# Patient Record
Sex: FEMALE | Race: WHITE | NOT HISPANIC OR LATINO | Employment: FULL TIME | URBAN - NONMETROPOLITAN AREA
[De-identification: names, ages, dates, MRNs, and addresses within clinical notes are randomized per-mention and may not be internally consistent; named-entity substitution may affect disease eponyms.]

---

## 2017-05-27 ENCOUNTER — APPOINTMENT (EMERGENCY)
Dept: MRI IMAGING | Facility: HOSPITAL | Age: 37
End: 2017-05-27
Payer: COMMERCIAL

## 2017-05-27 ENCOUNTER — APPOINTMENT (EMERGENCY)
Dept: CT IMAGING | Facility: HOSPITAL | Age: 37
End: 2017-05-27
Payer: COMMERCIAL

## 2017-05-27 ENCOUNTER — HOSPITAL ENCOUNTER (EMERGENCY)
Facility: HOSPITAL | Age: 37
Discharge: HOME | End: 2017-05-27
Attending: EMERGENCY MEDICINE
Payer: COMMERCIAL

## 2017-05-27 VITALS
SYSTOLIC BLOOD PRESSURE: 120 MMHG | DIASTOLIC BLOOD PRESSURE: 71 MMHG | BODY MASS INDEX: 24.92 KG/M2 | OXYGEN SATURATION: 100 % | RESPIRATION RATE: 18 BRPM | HEIGHT: 62 IN | HEART RATE: 78 BPM | TEMPERATURE: 99.2 F | WEIGHT: 135.4 LBS

## 2017-05-27 DIAGNOSIS — H53.8 BLURRY VISION, RIGHT EYE: Primary | ICD-10-CM

## 2017-05-27 LAB
ALBUMIN SERPL BCP-MCNC: 4 G/DL (ref 3.5–5)
ALP SERPL-CCNC: 54 U/L (ref 46–116)
ALT SERPL W P-5'-P-CCNC: 32 U/L (ref 12–78)
ANION GAP SERPL CALCULATED.3IONS-SCNC: 9 MMOL/L (ref 4–13)
AST SERPL W P-5'-P-CCNC: 21 U/L (ref 5–45)
BASOPHILS # BLD AUTO: 0.03 THOUSANDS/ΜL (ref 0–0.1)
BASOPHILS NFR BLD AUTO: 1 % (ref 0–1)
BILIRUB SERPL-MCNC: 0.2 MG/DL (ref 0.2–1)
BILIRUB UR QL STRIP: NEGATIVE
BUN SERPL-MCNC: 29 MG/DL (ref 5–25)
CALCIUM SERPL-MCNC: 8.6 MG/DL (ref 8.3–10.1)
CHLORIDE SERPL-SCNC: 108 MMOL/L (ref 100–108)
CLARITY UR: NORMAL
CO2 SERPL-SCNC: 25 MMOL/L (ref 21–32)
COLOR UR: YELLOW
CREAT SERPL-MCNC: 0.73 MG/DL (ref 0.6–1.3)
CRP SERPL QL: <3 MG/L
EOSINOPHIL # BLD AUTO: 0.1 THOUSAND/ΜL (ref 0–0.61)
EOSINOPHIL NFR BLD AUTO: 2 % (ref 0–6)
ERYTHROCYTE [DISTWIDTH] IN BLOOD BY AUTOMATED COUNT: 12.9 % (ref 11.6–15.1)
ERYTHROCYTE [SEDIMENTATION RATE] IN BLOOD: 5 MM/HOUR (ref 0–20)
GFR SERPL CREATININE-BSD FRML MDRD: >60 ML/MIN/1.73SQ M
GLUCOSE SERPL-MCNC: 90 MG/DL (ref 65–140)
GLUCOSE UR STRIP-MCNC: NEGATIVE MG/DL
HCG UR QL: NEGATIVE
HCT VFR BLD AUTO: 39.1 % (ref 34.8–46.1)
HGB BLD-MCNC: 13.2 G/DL (ref 11.5–15.4)
HGB UR QL STRIP.AUTO: NEGATIVE
HOLD SPECIMEN: YES
INR PPP: 1.06 (ref 0.86–1.16)
KETONES UR STRIP-MCNC: NEGATIVE MG/DL
LEUKOCYTE ESTERASE UR QL STRIP: NEGATIVE
LYMPHOCYTES # BLD AUTO: 1.49 THOUSANDS/ΜL (ref 0.6–4.47)
LYMPHOCYTES NFR BLD AUTO: 30 % (ref 14–44)
MCH RBC QN AUTO: 32 PG (ref 26.8–34.3)
MCHC RBC AUTO-ENTMCNC: 33.8 G/DL (ref 31.4–37.4)
MCV RBC AUTO: 95 FL (ref 82–98)
MONOCYTES # BLD AUTO: 0.36 THOUSAND/ΜL (ref 0.17–1.22)
MONOCYTES NFR BLD AUTO: 7 % (ref 4–12)
NEUTROPHILS # BLD AUTO: 2.96 THOUSANDS/ΜL (ref 1.85–7.62)
NEUTS SEG NFR BLD AUTO: 60 % (ref 43–75)
NITRITE UR QL STRIP: NEGATIVE
PH UR STRIP.AUTO: 7 [PH] (ref 4.5–8)
PLATELET # BLD AUTO: 247 THOUSANDS/UL (ref 149–390)
PMV BLD AUTO: 10.2 FL (ref 8.9–12.7)
POTASSIUM SERPL-SCNC: 3.8 MMOL/L (ref 3.5–5.3)
PROT SERPL-MCNC: 6.7 G/DL (ref 6.4–8.2)
PROT UR STRIP-MCNC: NEGATIVE MG/DL
PROTHROMBIN TIME: 13.7 SECONDS (ref 12.1–14.4)
RBC # BLD AUTO: 4.13 MILLION/UL (ref 3.81–5.12)
SODIUM SERPL-SCNC: 142 MMOL/L (ref 136–145)
SP GR UR STRIP.AUTO: 1.02 (ref 1–1.03)
TROPONIN I SERPL-MCNC: <0.02 NG/ML
TSH SERPL DL<=0.05 MIU/L-ACNC: 2.01 UIU/ML (ref 0.36–3.74)
UROBILINOGEN UR QL STRIP.AUTO: 0.2 E.U./DL
WBC # BLD AUTO: 4.94 THOUSAND/UL (ref 4.31–10.16)

## 2017-05-27 PROCEDURE — 85025 COMPLETE CBC W/AUTO DIFF WBC: CPT | Performed by: EMERGENCY MEDICINE

## 2017-05-27 PROCEDURE — 84484 ASSAY OF TROPONIN QUANT: CPT | Performed by: EMERGENCY MEDICINE

## 2017-05-27 PROCEDURE — 70553 MRI BRAIN STEM W/O & W/DYE: CPT

## 2017-05-27 PROCEDURE — 99284 EMERGENCY DEPT VISIT MOD MDM: CPT

## 2017-05-27 PROCEDURE — 86140 C-REACTIVE PROTEIN: CPT | Performed by: EMERGENCY MEDICINE

## 2017-05-27 PROCEDURE — 70549 MR ANGIOGRAPH NECK W/O&W/DYE: CPT

## 2017-05-27 PROCEDURE — 85652 RBC SED RATE AUTOMATED: CPT | Performed by: EMERGENCY MEDICINE

## 2017-05-27 PROCEDURE — 81003 URINALYSIS AUTO W/O SCOPE: CPT | Performed by: EMERGENCY MEDICINE

## 2017-05-27 PROCEDURE — 36415 COLL VENOUS BLD VENIPUNCTURE: CPT | Performed by: EMERGENCY MEDICINE

## 2017-05-27 PROCEDURE — A9585 GADOBUTROL INJECTION: HCPCS | Performed by: EMERGENCY MEDICINE

## 2017-05-27 PROCEDURE — 96374 THER/PROPH/DIAG INJ IV PUSH: CPT

## 2017-05-27 PROCEDURE — 85610 PROTHROMBIN TIME: CPT | Performed by: EMERGENCY MEDICINE

## 2017-05-27 PROCEDURE — 81025 URINE PREGNANCY TEST: CPT | Performed by: EMERGENCY MEDICINE

## 2017-05-27 PROCEDURE — 80053 COMPREHEN METABOLIC PANEL: CPT | Performed by: EMERGENCY MEDICINE

## 2017-05-27 PROCEDURE — 93005 ELECTROCARDIOGRAM TRACING: CPT | Performed by: EMERGENCY MEDICINE

## 2017-05-27 PROCEDURE — 84443 ASSAY THYROID STIM HORMONE: CPT | Performed by: EMERGENCY MEDICINE

## 2017-05-27 PROCEDURE — 70450 CT HEAD/BRAIN W/O DYE: CPT

## 2017-05-27 RX ORDER — PREDNISONE 20 MG/1
60 TABLET ORAL ONCE
Status: COMPLETED | OUTPATIENT
Start: 2017-05-27 | End: 2017-05-27

## 2017-05-27 RX ORDER — FOLIC ACID 1 MG/1
1 TABLET ORAL DAILY
COMMUNITY

## 2017-05-27 RX ORDER — DIPHENHYDRAMINE HYDROCHLORIDE 50 MG/ML
50 INJECTION INTRAMUSCULAR; INTRAVENOUS ONCE
Status: COMPLETED | OUTPATIENT
Start: 2017-05-27 | End: 2017-05-27

## 2017-05-27 RX ORDER — ASPIRIN 81 MG/1
81 TABLET ORAL DAILY
COMMUNITY

## 2017-05-27 RX ORDER — ZONISAMIDE 100 MG/1
100 CAPSULE ORAL 3 TIMES DAILY
COMMUNITY

## 2017-05-27 RX ADMIN — PREDNISONE 60 MG: 20 TABLET ORAL at 14:39

## 2017-05-27 RX ADMIN — DIPHENHYDRAMINE HYDROCHLORIDE 50 MG: 50 INJECTION, SOLUTION INTRAMUSCULAR; INTRAVENOUS at 14:42

## 2017-05-27 RX ADMIN — GADOBUTROL 6 ML: 604.72 INJECTION INTRAVENOUS at 15:52

## 2017-05-30 LAB
ATRIAL RATE: 77 BPM
P AXIS: 63 DEGREES
PR INTERVAL: 144 MS
QRS AXIS: 90 DEGREES
QRSD INTERVAL: 90 MS
QT INTERVAL: 386 MS
QTC INTERVAL: 436 MS
T WAVE AXIS: 66 DEGREES
VENTRICULAR RATE: 77 BPM

## 2019-11-19 ENCOUNTER — TRANSCRIBE ORDERS (OUTPATIENT)
Dept: ADMINISTRATIVE | Facility: HOSPITAL | Age: 39
End: 2019-11-19

## 2019-11-19 DIAGNOSIS — Z12.31 ENCOUNTER FOR SCREENING MAMMOGRAM FOR MALIGNANT NEOPLASM OF BREAST: Primary | ICD-10-CM

## 2019-11-19 DIAGNOSIS — Z80.41 FAMILY HISTORY OF OVARIAN CANCER: ICD-10-CM

## 2019-12-26 ENCOUNTER — HOSPITAL ENCOUNTER (OUTPATIENT)
Dept: ULTRASOUND IMAGING | Facility: HOSPITAL | Age: 39
Discharge: HOME | End: 2019-12-26
Payer: COMMERCIAL

## 2019-12-26 ENCOUNTER — HOSPITAL ENCOUNTER (OUTPATIENT)
Dept: MAMMOGRAPHY | Facility: HOSPITAL | Age: 39
Discharge: HOME | End: 2019-12-26
Payer: COMMERCIAL

## 2019-12-26 VITALS — WEIGHT: 147 LBS | BODY MASS INDEX: 27.05 KG/M2 | HEIGHT: 62 IN

## 2019-12-26 DIAGNOSIS — Z12.31 ENCOUNTER FOR SCREENING MAMMOGRAM FOR MALIGNANT NEOPLASM OF BREAST: ICD-10-CM

## 2019-12-26 DIAGNOSIS — Z80.41 FAMILY HISTORY OF OVARIAN CANCER: ICD-10-CM

## 2019-12-26 DIAGNOSIS — N64.4 PAIN IN THE BREAST: ICD-10-CM

## 2019-12-26 PROCEDURE — 76642 ULTRASOUND BREAST LIMITED: CPT

## 2019-12-26 PROCEDURE — 77066 DX MAMMO INCL CAD BI: CPT

## 2019-12-26 PROCEDURE — G0279 TOMOSYNTHESIS, MAMMO: HCPCS

## 2023-11-22 ENCOUNTER — APPOINTMENT (RX ONLY)
Dept: URBAN - NONMETROPOLITAN AREA CLINIC 4 | Facility: CLINIC | Age: 43
Setting detail: DERMATOLOGY
End: 2023-11-22

## 2023-11-22 DIAGNOSIS — L71.8 OTHER ROSACEA: ICD-10-CM

## 2023-11-22 PROCEDURE — 99203 OFFICE O/P NEW LOW 30 MIN: CPT

## 2023-11-22 PROCEDURE — ? TREATMENT REGIMEN

## 2023-11-22 PROCEDURE — ? PRESCRIPTION

## 2023-11-22 PROCEDURE — ? COUNSELING

## 2023-11-22 RX ORDER — METRONIDAZOLE 7.5 MG/G
0.75% CREAM TOPICAL QD
Qty: 45 | Refills: 3 | Status: ERX | COMMUNITY
Start: 2023-11-22

## 2023-11-22 RX ORDER — DOXYCYCLINE HYCLATE 100 MG/1
100MG TABLET, COATED ORAL BID
Qty: 60 | Refills: 4 | Status: ERX | COMMUNITY
Start: 2023-11-22

## 2023-11-22 RX ADMIN — DOXYCYCLINE HYCLATE 100MG: 100 TABLET, COATED ORAL at 00:00

## 2023-11-22 RX ADMIN — METRONIDAZOLE 0.75%: 7.5 CREAM TOPICAL at 00:00

## 2023-11-22 ASSESSMENT — LOCATION DETAILED DESCRIPTION DERM
LOCATION DETAILED: LEFT CENTRAL MALAR CHEEK
LOCATION DETAILED: RIGHT MEDIAL MALAR CHEEK

## 2023-11-22 ASSESSMENT — LOCATION SIMPLE DESCRIPTION DERM
LOCATION SIMPLE: LEFT CHEEK
LOCATION SIMPLE: RIGHT CHEEK

## 2023-11-22 ASSESSMENT — SEVERITY ASSESSMENT OVERALL AMONG ALL PATIENTS
IN YOUR EXPERIENCE, AMONG ALL PATIENTS YOU HAVE SEEN WITH THIS CONDITION, HOW SEVERE IS THIS PATIENT'S CONDITION?: MILD TO MODERATE

## 2023-11-22 ASSESSMENT — LOCATION ZONE DERM: LOCATION ZONE: FACE

## 2023-11-22 NOTE — HPI: RASH (ROSACEA)
How Severe Is Your Rosacea?: mild
Is This A New Presentation, Or A Follow-Up?: Rash
Additional History: Patient was on doxycycline 150 QD , eucrisa and rhofade

## 2024-07-19 ENCOUNTER — OFFICE VISIT (OUTPATIENT)
Dept: GASTROENTEROLOGY | Facility: CLINIC | Age: 44
End: 2024-07-19
Payer: COMMERCIAL

## 2024-07-19 VITALS
SYSTOLIC BLOOD PRESSURE: 113 MMHG | WEIGHT: 147.8 LBS | BODY MASS INDEX: 25.23 KG/M2 | HEART RATE: 83 BPM | OXYGEN SATURATION: 97 % | DIASTOLIC BLOOD PRESSURE: 70 MMHG | TEMPERATURE: 98.3 F | HEIGHT: 64 IN

## 2024-07-19 DIAGNOSIS — K59.09 CHRONIC CONSTIPATION: ICD-10-CM

## 2024-07-19 DIAGNOSIS — R10.12 LUQ PAIN: ICD-10-CM

## 2024-07-19 DIAGNOSIS — Z00.00 HEALTHCARE MAINTENANCE: ICD-10-CM

## 2024-07-19 DIAGNOSIS — Z87.11 HISTORY OF PEPTIC ULCER: Primary | ICD-10-CM

## 2024-07-19 DIAGNOSIS — R19.5 DARK STOOLS: ICD-10-CM

## 2024-07-19 PROCEDURE — 99204 OFFICE O/P NEW MOD 45 MIN: CPT | Performed by: PHYSICIAN ASSISTANT

## 2024-07-19 RX ORDER — PANTOPRAZOLE SODIUM 40 MG/1
40 TABLET, DELAYED RELEASE ORAL 2 TIMES DAILY
Qty: 180 TABLET | Refills: 3 | Status: SHIPPED | OUTPATIENT
Start: 2024-07-19

## 2024-07-19 RX ORDER — PANTOPRAZOLE SODIUM 40 MG/1
40 TABLET, DELAYED RELEASE ORAL 2 TIMES DAILY
COMMUNITY
End: 2024-07-19

## 2024-07-19 RX ORDER — FAMOTIDINE 40 MG/1
40 TABLET, FILM COATED ORAL DAILY
Qty: 30 TABLET | Refills: 5 | Status: SHIPPED | OUTPATIENT
Start: 2024-07-19

## 2024-07-19 RX ORDER — RIMEGEPANT SULFATE 75 MG/75MG
75 TABLET, ORALLY DISINTEGRATING ORAL DAILY PRN
COMMUNITY

## 2024-07-19 RX ORDER — TOPIRAMATE SPINKLE 25 MG/1
75 CAPSULE ORAL
COMMUNITY

## 2024-07-19 NOTE — PROGRESS NOTES
[facility] - Outpatient Consultation  Camille Barrientos 43 y.o. female MRN: [id number]  Encounter: [id number]    ASSESSMENT AND PLAN:      1. History of peptic ulcer  2. LUQ pain  3. Dark stools    Pt with hx of PUD in early 2024 which was treated with PPI therapy.   Complete records not available but appears bx were negative for h pylori and celiac disease.   Now with recurrence of abdominal pain and endorsement of dark appearing stools, which may indicate melena vs other etiol.     Recommend repeat EGD now to evaluate for recurrence of PUD.   Continue PPI BID and add nightly H2RA for symptom relief.   Check CBC to ensure no new iron def anemia.   Small frequent meals and diet and lifestyle modifications for GERD.     I obtained informed consent from the patient. The risks/benefits/alternatives of the procedure were discussed with the patient. Risks included, but not limited to, infection, bleeding, perforation, injury to organs in the abdomen, missed lesion and incomplete procedure were discussed. Patient was agreeable and electronic signature was obtained.    - famotidine (PEPCID) 40 MG tablet; Take 1 tablet (40 mg total) by mouth daily  Dispense: 30 tablet; Refill: 5  - pantoprazole (PROTONIX) 40 mg tablet; Take 1 tablet (40 mg total) by mouth 2 (two) times a day  Dispense: 180 tablet; Refill: 3  - EGD; Future  - CBC and differential; Future  - Iron Panel (Includes Ferritin, Iron Sat%, Iron, and TIBC); Future    4. Chronic constipation    Notes hx of chronic constipation.  Recommend high fiber diet, excellent hydration, regular physical activity as tolerated.   Recommend trial daily miralax 17 g in 8 ounces liquid of choice.   Can titrate/taper to effect.   If no improvement, contact clinic for prescription cathartic trial.     5. Healthcare maintenance    Pt due for colonoscopy for cancer screening purposes at age 45, sooner if clinically indicated.     We will follow up with pt after  "endoscopic evaluation is completed.   ______________________________________________________________________    HPI: Patient is a 43 y.o. female who presents today for a consultation regarding abdominal pain. 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     Pt is new to clinic. Shares that was diagnosed with gastric ulcer in 01/2024. Treated with PPI BID. Notes over past few months, she has had recurrence of LUQ pain, gnawing pain. Hurts most when stomach is empty. Eating temporarily improves discomfort though then it recurs. Pt notes nausea, no emesis or hematemesis. No dysphagia or odynophagia. Pain is now starting to radiate into LLQ and around to back. Notes she awakens at night from the pain. Tried sucralfate addition, this worsened her symptoms. Eats a bland diet, ie chicken and rice soup. Tried some milk of mag, thinks this helped. Also tried something called \"zypan\" which is a natural supplement.     Pt notes that pertaining to her bowels, she tends toward constipation at baseline. Going three times per week. Does need to strain at times. Notes she uses apple juice and water which helps to alleviate constipation. She notes that when she started to have recurrence of abdominal pain, she noted stool was black and tarry. No hematochezia. No family hx of CRC.     RUQ US: 12/2023: Few small nonobstructing bilateral renal calculi.   02/2024: Hb 13.9, MCV 93, Plt 194, BUN 30, Cr 0.71, AST 17, ALT 11, ALP 42, t bili 0.5, albumin 4.6, ferritin 57, iron 80, TSAT 30, TIBC 269     NSAIDs: no regular use   Etoh: none   Tobacco: none    Endoscopic history:   EGD: 01/2024  A. Duodenal biopsy: Small bowel with well-preserved villous architecture and no significant increase of intraepithelial lymphocytes. Negative for histologic features suggestive of celiac disease.   B. Gastric antrum biopsy: Mild reactive gastropathy. Very mild chronic inflammation. Immunostain for H pylori negative.  C. Gastric " body biopsy: Gastric body to tissue, oxyntic mucosa with minimal inactive inflammation.   EGD: 03/2024:   A. GASTRIC ANTRUM (BIOPSY): Nonspecific patchy mild chronic gastritis. Histologic features of H. pylori gastritis are not identified.    B. GASTRIC BODY (BIOPSY): Nonspecific patchy mild chronic gastritis. Histologic features of H. pylori gastritis are not identified   Colon: unknown     Review of Systems   Constitutional:  Negative for fever.   HENT:  Negative for mouth sores and trouble swallowing.    Gastrointestinal:  Positive for abdominal pain, constipation and nausea. Negative for diarrhea and vomiting.   Genitourinary:  Negative for dysuria, frequency and hematuria.   Musculoskeletal:  Negative for arthralgias and myalgias.   Neurological:  Positive for headaches.   Hematological: Negative.    Otherwise Per HPI    Historical Information   Past Medical History:   Diagnosis Date    Anemia 2024    Cancer (HCC) 2016    Cervical    Clotting disorder (HCC) 2010    High Factor 8    Factor VIII (functional) deficiency (HCC)     Gastric ulcer 2019-24    GERD (gastroesophageal reflux disease) 2018    Hernia 2019    Irritable bowel syndrome     Migraine     Seizures (HCC)      Past Surgical History:   Procedure Laterality Date    APPENDECTOMY      BREAST IMPLANT REMOVAL Bilateral     implants 2014 saline    CERVICAL CONE BIOPSY      DILATION AND CURETTAGE OF UTERUS      HEMORROIDECTOMY      MOUTH SURGERY      TONSILLECTOMY      UPPER GASTROINTESTINAL ENDOSCOPY  2019-24     Social History   Social History     Substance and Sexual Activity   Alcohol Use No     Social History     Substance and Sexual Activity   Drug Use No     Social History     Tobacco Use   Smoking Status Never   Smokeless Tobacco Not on file     Family History   Problem Relation Age of Onset    Cancer Mother     Hypertension Mother     Hypothyroidism Mother     Hearing loss Sister     No Known Problems Daughter     Ovarian cancer Maternal Aunt      "Cancer Maternal Aunt     No Known Problems Paternal Aunt     Lung cancer Paternal Aunt     Cancer Paternal Aunt     No Known Problems Paternal Aunt     No Known Problems Paternal Aunt     No Known Problems Paternal Aunt     No Known Problems Paternal Aunt     Cancer Paternal Aunt     No Known Problems Paternal Aunt     Cancer Father     Hearing loss Father     Hypertension Father     Cancer Maternal Grandfather     Stroke Maternal Grandfather     Heart disease Paternal Grandmother     Cancer Paternal Uncle     Hearing loss Brother        Meds/Allergies       Current Outpatient Medications:     aspirin (ECOTRIN LOW STRENGTH) 81 mg EC tablet    folic acid (FOLVITE) 1 mg tablet    zonisamide (ZONEGRAN) 100 mg capsule    Allergies   Allergen Reactions    Ciprofloxacin Hives    Imitrex [Sumatriptan] Other (See Comments)     Muscle tightness    Iodine - Food Allergy Hives    Iohexol     Morphine Swelling    Other Hives     catscan dye    Biaxin [Clarithromycin] Rash    Keflex [Cephalexin] Rash    Penicillins Rash       Objective     Blood pressure 113/70, pulse 83, temperature 98.3 °F (36.8 °C), temperature source Temporal, height 5' 4\" (1.626 m), weight 67 kg (147 lb 12.8 oz), SpO2 97%. Body mass index is 25.37 kg/m².    Physical Exam  Vitals and nursing note reviewed.   Constitutional:       General: She is not in acute distress.     Appearance: She is well-developed.   HENT:      Head: Normocephalic and atraumatic.   Eyes:      General: No scleral icterus.     Conjunctiva/sclera: Conjunctivae normal.   Cardiovascular:      Rate and Rhythm: Normal rate and regular rhythm.      Heart sounds: No murmur heard.  Pulmonary:      Effort: Pulmonary effort is normal. No respiratory distress.   Abdominal:      General: Bowel sounds are normal. There is no distension.      Palpations: Abdomen is soft.      Tenderness: There is abdominal tenderness. There is no guarding or rebound.   Skin:     General: Skin is warm and dry.      " Coloration: Skin is not jaundiced.   Neurological:      General: No focal deficit present.      Mental Status: She is alert.   Psychiatric:         Mood and Affect: Mood normal.         Behavior: Behavior normal.        Lab Results:   No visits with results within 1 Day(s) from this visit.   Latest known visit with results is:   Admission on 05/27/2017, Discharged on 05/27/2017   Component Date Value    WBC 05/27/2017 4.94     RBC 05/27/2017 4.13     Hemoglobin 05/27/2017 13.2     Hematocrit 05/27/2017 39.1     MCV 05/27/2017 95     MCH 05/27/2017 32.0     MCHC 05/27/2017 33.8     RDW 05/27/2017 12.9     MPV 05/27/2017 10.2     Platelets 05/27/2017 247     Segmented % 05/27/2017 60     Lymphocytes % 05/27/2017 30     Monocytes % 05/27/2017 7     Eosinophils Relative 05/27/2017 2     Basophils Relative 05/27/2017 1     Absolute Neutrophils 05/27/2017 2.96     Absolute Lymphocytes 05/27/2017 1.49     Absolute Monocytes 05/27/2017 0.36     Eosinophils Absolute 05/27/2017 0.10     Basophils Absolute 05/27/2017 0.03     Sodium 05/27/2017 142     Potassium 05/27/2017 3.8     Chloride 05/27/2017 108     CO2 05/27/2017 25     ANION GAP 05/27/2017 9     BUN 05/27/2017 29 (H)     Creatinine 05/27/2017 0.73     Glucose 05/27/2017 90     Calcium 05/27/2017 8.6     AST 05/27/2017 21     ALT 05/27/2017 32     Alkaline Phosphatase 05/27/2017 54     Total Protein 05/27/2017 6.7     Albumin 05/27/2017 4.0     Total Bilirubin 05/27/2017 0.20     eGFR 05/27/2017 >60.0     Color, UA 05/27/2017 Yellow     Clarity, UA 05/27/2017 Cloudy     Specific Gravity, UA 05/27/2017 1.025     pH, UA 05/27/2017 7.0     Leukocytes, UA 05/27/2017 Negative     Nitrite, UA 05/27/2017 Negative     Protein, UA 05/27/2017 Negative     Glucose, UA 05/27/2017 Negative     Ketones, UA 05/27/2017 Negative     Urobilinogen, UA 05/27/2017 0.2     Bilirubin, UA 05/27/2017 Negative     Occult Blood, UA 05/27/2017 Negative     Preg Test, Ur (Ref: Nega* 05/27/2017  negative     TSH 3RD GENERATON 05/27/2017 2.007     Sed Rate 05/27/2017 5     CRP 05/27/2017 <3.0     Ventricular Rate 05/27/2017 77     Atrial Rate 05/27/2017 77     IN Interval 05/27/2017 144     QRSD Interval 05/27/2017 90     QT Interval 05/27/2017 386     QTC Interval 05/27/2017 436     P Axis 05/27/2017 63     QRS [place] 05/27/2017 90     T Wave [place] 05/27/2017 66     Troponin I 05/27/2017 <0.02     Protime 05/27/2017 13.7     INR 05/27/2017 1.06     Extra Tube 05/27/2017 Yes        Radiology Results:   No results found.    Juju Riley PA-C    **Please note:  Dictation voice to text software may have been used in the creation of this record.  Occasional wrong word or “sound alike” substitutions may have occurred due to the inherent limitations of voice recognition software.  Read the chart carefully and recognize, using context, where substitutions have occurred.**

## 2024-07-19 NOTE — PATIENT INSTRUCTIONS
Stay on pantoprazole twice daily.   Start famotidine 40mg before bed.   Small frequent meals.   Start miralax to help completely evacuate bowels.   Blood work at your convenience.

## 2025-01-05 DIAGNOSIS — Z87.11 HISTORY OF PEPTIC ULCER: ICD-10-CM

## 2025-01-05 DIAGNOSIS — R19.5 DARK STOOLS: ICD-10-CM

## 2025-01-06 RX ORDER — FAMOTIDINE 40 MG/1
40 TABLET, FILM COATED ORAL DAILY
Qty: 30 TABLET | Refills: 5 | Status: SHIPPED | OUTPATIENT
Start: 2025-01-06

## 2025-01-06 NOTE — TELEPHONE ENCOUNTER
Calling to let you know Pepsid refill was sent to pharmacy per your request. We do need to schedule an office visit to allow for future refills. Please call us at [phone] for an appointment.

## 2025-01-07 NOTE — TELEPHONE ENCOUNTER
Patient called the RX Refill Line. Message is being forwarded to the office.     Patient called back to make the office aware that her insurance is now out of network and she will not be able to make any additional follow up appointments.